# Patient Record
Sex: FEMALE
[De-identification: names, ages, dates, MRNs, and addresses within clinical notes are randomized per-mention and may not be internally consistent; named-entity substitution may affect disease eponyms.]

---

## 2017-04-24 DIAGNOSIS — Q21.3 TETRALOGY OF FALLOT: ICD-10-CM

## 2017-04-24 RX ORDER — AMOXICILLIN 500 MG/1
CAPSULE ORAL
Qty: 12 CAPSULE | Refills: 0 | Status: SHIPPED | OUTPATIENT
Start: 2017-04-24 | End: 2017-05-04

## 2017-05-04 DIAGNOSIS — Q21.3 TETRALOGY OF FALLOT: ICD-10-CM

## 2017-05-04 RX ORDER — AMOXICILLIN 500 MG/1
CAPSULE ORAL
Qty: 12 CAPSULE | Refills: 0 | Status: SHIPPED | OUTPATIENT
Start: 2017-05-04

## 2017-12-24 ENCOUNTER — HEALTH MAINTENANCE LETTER (OUTPATIENT)
Age: 37
End: 2017-12-24

## 2018-02-02 DIAGNOSIS — Q21.3 TETRALOGY OF FALLOT: Primary | ICD-10-CM

## 2018-02-28 ENCOUNTER — PRE VISIT (OUTPATIENT)
Dept: CARDIOLOGY | Facility: CLINIC | Age: 38
End: 2018-02-28

## 2018-03-01 ENCOUNTER — HOSPITAL ENCOUNTER (OUTPATIENT)
Dept: CARDIOLOGY | Facility: CLINIC | Age: 38
Discharge: HOME | End: 2018-03-01
Attending: INTERNAL MEDICINE | Admitting: INTERNAL MEDICINE
Payer: COMMERCIAL

## 2018-03-01 ENCOUNTER — OFFICE VISIT (OUTPATIENT)
Dept: PEDIATRIC CARDIOLOGY | Facility: CLINIC | Age: 38
End: 2018-03-01
Attending: INTERNAL MEDICINE
Payer: COMMERCIAL

## 2018-03-01 VITALS
HEART RATE: 65 BPM | RESPIRATION RATE: 18 BRPM | OXYGEN SATURATION: 99 % | BODY MASS INDEX: 21.91 KG/M2 | WEIGHT: 123.68 LBS | HEIGHT: 63 IN | DIASTOLIC BLOOD PRESSURE: 74 MMHG | SYSTOLIC BLOOD PRESSURE: 113 MMHG

## 2018-03-01 DIAGNOSIS — Q21.3 TETRALOGY OF FALLOT: ICD-10-CM

## 2018-03-01 DIAGNOSIS — Q21.3 TETRALOGY OF FALLOT: Primary | ICD-10-CM

## 2018-03-01 PROCEDURE — 94760 N-INVAS EAR/PLS OXIMETRY 1: CPT | Mod: ZF

## 2018-03-01 PROCEDURE — 93010 ELECTROCARDIOGRAM REPORT: CPT | Mod: ZP | Performed by: INTERNAL MEDICINE

## 2018-03-01 PROCEDURE — 99214 OFFICE O/P EST MOD 30 MIN: CPT | Mod: ZP | Performed by: INTERNAL MEDICINE

## 2018-03-01 PROCEDURE — G0463 HOSPITAL OUTPT CLINIC VISIT: HCPCS | Mod: ZF

## 2018-03-01 PROCEDURE — 93303 ECHO TRANSTHORACIC: CPT

## 2018-03-01 ASSESSMENT — PAIN SCALES - GENERAL: PAINLEVEL: NO PAIN (0)

## 2018-03-01 NOTE — MR AVS SNAPSHOT
"              After Visit Summary   3/1/2018    Tamika Johnson    MRN: [id number]           Patient Information     Date Of Birth          1980        Visit Information        Provider Department      3/1/2018 11:00 AM Nicolasa Christensen MD [facility]        Today's Diagnoses     Tetralogy of Fallot    -  1      Care Instructions    You were seen today in the Adult Congenital and Cardiovascular Genetics Clinic at the [facility].    Cardiology Providers you saw during your visit:  Dr. Nicolasa Christensen    Diagnosis:  Tetrology of Fallot    Results:  The results of your echo were discussed with you today    Recommendations:    1.  Continue to eat a heart healthy, low salt diet.  2.  Continue to get 20-30 minutes of aerobic activity, 4-5 days per week.  Examples of aerobic activity include walking, running, swimming, cycling, etc.  3.  Continue to observe good oral hygiene, with regular dental visits.  4.  Please take your baby aspirin (81mg) daily.   5. Please use caution when taking any other medication that would prolong your QT prolonging: [email or web address]      Vitals:    03/01/18 1012   BP: 113/74   Pulse: 65   Resp: 18   SpO2: 99%   Weight: 56.1 kg (123 lb 10.9 oz)   Height: 1.593 m (5' 2.72\")       SBE prophylaxis:   Yes_X___  No____    Lifelong Bacterial Endocarditis Prophylaxis:  YES_X___  NO____    If YES is checked, follow the recommendations outlined below:  1. Take antibiotic(s) prior to interventional procedures or surgeries (dental, respiratory, urologic, gastrointestinal, gynecologic), or instrumental examinations.   2. Observe good oral hygiene daily, as advised by your dentist. Get regular professional dental care.  3. Keep cuts clean.  4. Infections should be treated promptly.      Exercise restrictions:   Yes__X__  No____         If yes, list restrictions:  Must be allowed to rest if fatigued or SOB      Work " restrictions:  Yes____  No__x__         If yes, list restrictions:    FASTING CHOLESTEROL was checked in the last 5 years YES_x__  NO___ 2016  Continue to eat a heart healthy, low salt diet.         ____ Fasting lipid panel order today         ____ No changes in medications          ____ I recommend the following changes in your cholesterol medications.:          ____ Please follow up for cholesterol screening at your primary care physician      Follow-up:  Follow up with Dr. Christensen in 1 year with an echo and labs (CBC. CMP, TSH and Lipid panel)    For after hours urgent needs, call [phone] and ask to speak to the Adult Congenital Physician on call.  Mention Job Code 0401.    For emergencies call [phone].    For any scheduling needs and to contact your nurse in the Adult Congenital and Cardiovascular Genetics Clinic, please call Figueroa Grimes, Procedure , at [phone].    Thank you for your visit today!  If you have questions or concerns about today's visit, please call me.    Mahogany Baptiste RN, BSN  Cardiology Care Coordinator  [facility]  [email or web address]  Ph.[phone]    [facility]  [facility]  Mail Code 2121CK  [address] [place]  [address]           Follow-ups after your visit        Additional Services     Follow-Up with Cardiologist       Echo and labs prior                  Future tests that were ordered for you today     Open Future Orders        Priority Expected Expires Ordered    TSH with free T4 reflex Routine 3/1/2019 3/1/2019 3/1/2018    CBC with platelets differential Routine 3/1/2019 3/1/2019 3/1/2018    Comprehensive metabolic panel Routine 3/1/2019 3/1/2019 3/1/2018    Lipid panel reflex to direct LDL Fasting Routine 3/1/2019 3/1/2019 3/1/2018    Follow-Up with Cardiologist Routine 3/7/2019 3/29/2019 3/1/2018    Echo congenital adult Routine  "3/7/2019 3/29/2019 3/1/2018    EKG 12-lead, tracing only (Future) Routine  6/29/2018 3/1/2018    Echo pediatric congenital Routine  2/26/2019 2/26/2018            Who to contact     If you have questions or need follow up information about today's clinic visit or your schedule please contact [facility] directly at [phone].  Normal or non-critical lab and imaging results will be communicated to you by TrabajoPanelhart, letter or phone within 4 business days after the clinic has received the results. If you do not hear from us within 7 days, please contact the clinic through [employer] or phone. If you have a critical or abnormal lab result, we will notify you by phone as soon as possible.  Submit refill requests through [employer] or call your pharmacy and they will forward the refill request to us. Please allow 3 business days for your refill to be completed.          Additional Information About Your Visit        [employer] Information     [employer] gives you secure access to your electronic health record. If you see a primary care provider, you can also send messages to your care team and make appointments. If you have questions, please call your primary care clinic.  If you do not have a primary care provider, please call [phone] and they will assist you.        Care EveryWhere ID     This is your Care EveryWhere ID. This could be used by other organizations to access your [facility] medical records  [id number]        Your Vitals Were     Pulse Respirations Height Pulse Oximetry BMI (Body Mass Index)       65 18 1.593 m (5' 2.72\") 99% 22.11 kg/m2        Blood Pressure from Last 3 Encounters:   03/01/18 113/74   09/01/16 120/72   02/04/16 99/59    Weight from Last 3 Encounters:   03/01/18 56.1 kg (123 lb 10.9 oz)   09/01/16 53.7 kg (118 lb 6.2 oz)   02/04/16 52.1 kg (114 lb 13.8 oz)              We Performed the Following     ELECTROCARDIOGRAM REPORT     Follow-Up with Cardiologist        " Primary Care Provider Office Phone # Fax #    Izabella Beaulieu [phone]       [facility] [address] [place] [address]        Equal Access to Services     ROSECRISTINA DANE : Hadii aad ku hadmarleno Soyanelisali, waaxda luqadaha, qaybta kaalmada adeegisraelda, malcom prieto laUgoimani torres. So [facility] [phone].    ATENCIÓN: Si habla español, tiene a kwong disposición servicios gratuitos de asistencia lingüística. Gonzaloame al [phone].    We comply with applicable federal civil rights laws and [place] laws. We do not discriminate on the basis of race, color, national origin, age, disability, sex, sexual orientation, or gender identity.            Thank you!     Thank you for choosing [facility]  for your care. Our goal is always to provide you with excellent care. Hearing back from our patients is one way we can continue to improve our services. Please take a few minutes to complete the written survey that you may receive in the mail after your visit with us. Thank you!             Your Updated Medication List - Protect others around you: Learn how to safely use, store and throw away your medicines at www.disposemymeds.org.          This list is accurate as of 3/1/18 11:11 AM.  Always use your most recent med list.                   Brand Name Dispense Instructions for use Diagnosis    amoxicillin 500 MG capsule    AMOXIL    12 capsule    Take 2000mg (4 capsules) 30-60 minutes prior to dental work    Tetralogy of Fallot       aspirin EC 81 MG EC tablet      Take 1 tablet (81 mg) by mouth daily    Tetralogy of Fallot       ibuprofen 600 MG tablet    ADVIL/MOTRIN    60 tablet    Take 1 tablet (600 mg) by mouth every 6 hours as needed for other (cramping)    S/P  section       oxyCODONE-acetaminophen 5-325 MG per tablet    PERCOCET    40 tablet    Take 1-2 tablets by mouth every 4 hours as needed for moderate to severe pain    S/P  section        PRENATAL VITAMINS PO      Take by mouth daily        senna-docusate 8.6-50 MG per tablet    SENOKOT-S;PERICOLACE    60 tablet    Take 1-2 tablets by mouth 2 times daily    S/P  section       * TEGRETOL PO      Take 600 mg by mouth 2 times daily        * carBAMazepine 200 MG tablet    TEGRETOL    100 tablet    600 mg in the morning, 750 mg in the evening    Seizures (H)       VITAMIN D (CHOLECALCIFEROL) PO      Take 400 Units by mouth daily        * Notice:  This list has 2 medication(s) that are the same as other medications prescribed for you. Read the directions carefully, and ask your doctor or other care provider to review them with you.

## 2018-03-01 NOTE — PATIENT INSTRUCTIONS
"You were seen today in the Adult Congenital and Cardiovascular Genetics Clinic at the [facility].    Cardiology Providers you saw during your visit:  Dr. Nicolasa Christensen    Diagnosis:  Tetrology of Fallot    Results:  The results of your echo were discussed with you today    Recommendations:    1.  Continue to eat a heart healthy, low salt diet.  2.  Continue to get 20-30 minutes of aerobic activity, 4-5 days per week.  Examples of aerobic activity include walking, running, swimming, cycling, etc.  3.  Continue to observe good oral hygiene, with regular dental visits.  4.  Please take your baby aspirin (81mg) daily.   5. Please use caution when taking any other medication that would prolong your QT prolonging: [email or web address]      Vitals:    03/01/18 1012   BP: 113/74   Pulse: 65   Resp: 18   SpO2: 99%   Weight: 56.1 kg (123 lb 10.9 oz)   Height: 1.593 m (5' 2.72\")       SBE prophylaxis:   Yes_X___  No____    Lifelong Bacterial Endocarditis Prophylaxis:  YES_X___  NO____    If YES is checked, follow the recommendations outlined below:  1. Take antibiotic(s) prior to interventional procedures or surgeries (dental, respiratory, urologic, gastrointestinal, gynecologic), or instrumental examinations.   2. Observe good oral hygiene daily, as advised by your dentist. Get regular professional dental care.  3. Keep cuts clean.  4. Infections should be treated promptly.      Exercise restrictions:   Yes__X__  No____         If yes, list restrictions:  Must be allowed to rest if fatigued or SOB      Work restrictions:  Yes____  No__x__         If yes, list restrictions:    FASTING CHOLESTEROL was checked in the last 5 years YES_x__  NO___ 2016  Continue to eat a heart healthy, low salt diet.         ____ Fasting lipid panel order today         ____ No changes in medications          ____ I recommend the following changes in your cholesterol medications.:          ____ Please " follow up for cholesterol screening at your primary care physician      Follow-up:  Follow up with Dr. Christensen in 1 year with an echo and labs (CBC. CMP, TSH and Lipid panel)    For after hours urgent needs, call [phone] and ask to speak to the Adult Congenital Physician on call.  Mention Job Code 0401.    For emergencies call 911.    For any scheduling needs and to contact your nurse in the Adult Congenital and Cardiovascular Genetics Clinic, please call Figueroa Grimes, Procedure , at [phone].    Thank you for your visit today!  If you have questions or concerns about today's visit, please call me.    Mahogany Baptiste RN, BSN  Cardiology Care Coordinator  [facility]  [email or web address]  Ph.[phone]    [facility]  [facility]   Clinics and Surgery Center  Mail Code 2121CK  [address] [place] [address]  [address]

## 2018-03-01 NOTE — NURSING NOTE
"Informant-    Tamika is accompanied by self    Reason for Visit-  TOF    Vitals signs-  /74  Pulse 65  Resp 18  Ht 1.593 m (5' 2.72\")  Wt 56.1 kg (123 lb 10.9 oz)  SpO2 99%  BMI 22.11 kg/m2    There are concerns about the child's exposure to violence in the home: No    Face to Face time: 5 minutes  Taniya Lowery MA      "

## 2018-03-01 NOTE — NURSING NOTE
Chief Complaint   Patient presents with     RECHECK     TOF        Cardiac Testing: Patient given instructions regarding  echocardiogram . Discussed purpose, preparation, procedure and when to expect results reported back to the patient. Patient demonstrated understanding of this information and agreed to call with further questions or concerns.  Labs: Patient was given results of the laboratory testing obtained today. Patient was instructed to return for the next laboratory testing in 1 year . Patient demonstrated understanding of this information and agreed to call with further questions or concerns.   Med Reconcile: Reviewed and verified all current medications with the patient. The updated medication list was printed and given to the patient.  Return Appointment: Patient given instructions regarding scheduling next clinic visit. Patient demonstrated understanding of this information and agreed to call with further questions or concerns.  Patient stated she understood all health information given and agreed to call with further questions or concerns.     Mahogany Baptiste RN, BSN  Cardiology Care Coordinator  [facility]  [email or web address]  [phone]

## 2018-04-02 NOTE — PROGRESS NOTES
HPI: 36 yo female with h/o TOF s/p repair at ~6 months, residual VSD closed at ~10 months and PVR due to significant PI and RV dilation in 1995 who also has a seizure disorder presents for ongoing evaluation and management   Pt reports that she has been feeling well.  She denies any chest pain or pressure, sob/calderón, orthopnea, pnd, palpitations, syncope/presyncope, bernardo or change in exercise tolerance.  She admits that she hasn't been taking her asa regularly.      PAST MEDICAL HISTORY:  Past Medical History:   Diagnosis Date     Abnormal Pap smear     wnl 12/2014     Heart murmur      History of blood transfusion 1980     Seizure disorder (H)      Seizures (H)      Tetralogy of Fallot     s/p repair with patch closure of VSD and pericardial transannular patch 3/22/80, residual VSD closed after development of CHF 7/8/80, and PVR with homograft given significant PI and dilated RV 5/20/92     Thrombocytopenia, unspecified (H) 4/16/2015     WPW (Chivo-Parkinson-White syndrome)        FAMILY HISTORY:  Pt adopted therefore we have no information about her birth family.      Social History     Social History     Marital status:      Spouse name: N/A     Number of children: N/A     Years of education: N/A     Occupational History     Not on file.     Social History Main Topics     Smoking status: Never Smoker     Smokeless tobacco: Not on file     Alcohol use No     Drug use: No     Sexual activity: Yes     Partners: Male     Other Topics Concern     Not on file     Social History Narrative         CURRENT MEDICATIONS:  Current Outpatient Prescriptions   Medication Sig Dispense Refill     amoxicillin (AMOXIL) 500 MG capsule Take 2000mg (4 capsules) 30-60 minutes prior to dental work 12 capsule 0     oxyCODONE-acetaminophen (PERCOCET) 5-325 MG per tablet Take 1-2 tablets by mouth every 4 hours as needed for moderate to severe pain 40 tablet 0     ibuprofen (ADVIL,MOTRIN) 600 MG tablet Take 1 tablet (600 mg) by mouth  "every 6 hours as needed for other (cramping) 60 tablet 0     senna-docusate (SENOKOT-S;PERICOLACE) 8.6-50 MG per tablet Take 1-2 tablets by mouth 2 times daily 60 tablet 1     carBAMazepine (TEGRETOL) 200 MG tablet 600 mg in the morning, 750 mg in the evening 100 tablet 3     VITAMIN D, CHOLECALCIFEROL, PO Take 400 Units by mouth daily       Prenatal Multivit-Min-Fe-FA (PRENATAL VITAMINS PO) Take by mouth daily       CarBAMazepine (TEGRETOL PO) Take 600 mg by mouth 2 times daily       aspirin EC 81 MG tablet Take 1 tablet (81 mg) by mouth daily         ROS:   Constitutional: No fever, chills, or sweats.   ENT: No visual disturbance, ear ache, epistaxis, sore throat.   Allergies/Immunologic: Negative.   Respiratory: No cough, hemoptysis.   Cardiovascular: As per HPI.   GI: No nausea, vomiting, abdominal pain, melena or BRBPR  :No problems with urination.   Integument: Negative.   Psychiatric: Negative.   Neuro: Negative.   Endocrinology: Negative.   Musculoskeletal: Negative.    EXAM:  /74  Pulse 65  Resp 18  Ht 1.593 m (5' 2.72\")  Wt 56.1 kg (123 lb 10.9 oz)  SpO2 99%  BMI 22.11 kg/m2  General: appears comfortable, alert and articulate  Head: normocephalic, atraumatic, auricular deformity  Eyes: anicteric sclera, EOMI  Neck: no adenopathy, 2+ carotids with radiation of systolic murmur  Orophyarynx: moist mucosa, no lesions, dentition intact  Heart: regular, S1/S2, 3/6 ted at LUSB murmur,no diastolic murmur appreciated, no gallop, rub, estimated JVP 6cm  Lungs: clear, no rales or wheezing  Abdomen: soft, non-tender, bowel sounds present, no hepatomegaly  Extremities: no clubbing, cyanosis or edema  Neurological: normal speech and affect, no gross motor deficits      MR CARDIAC W CONTRAST W FLOW QUANT, MRA ANGIOGRAM CHEST W/O & W CONTRAST 8/2/2016      IMPRESSION:    1.  Normal left ventricular size and systolic function with a calculated ejection fraction of 54%.  2.  Mild right ventricular enlargement " (ED volume index: 109.28 ml/m2) with normal systolic function with a calculated ejection fraction of 51%.    3.  There is a basal inferior LV diverticulum that straddles the posterior mitral valve annulus. It measures approximately 3 cm x 2.5cm.  4.  There is moderate tricuspid regurgitation.  5.  There is a bioprosthetic pulmonic valve. There is mild to moderate pulmonary insufficiency (regurgitant volume 12 mL, regurgitant fraction 13%). There is mild to moderate pulmonic stenosis (peak velocity 2.8 m/s, peak pressure gradient 31 mmHg). The stenosis appears to be in the main pulmonary artery distal to the valve, rather than at the valvular level.  6.  On delayed enhancement imaging, there is no abnormal hyperenhancement to suggest myocardial scar/inflammation/infiltration.  7.  There is a right-sided aortic arch, with mirror image branching pattern.  8.  There is mild narrowing of the main pulmonary artery measuring 17mm x 17 mm. There is no branch pulmonary stenosis.      Holter Aug 2016      Echo today  Patient after repair of Tetralogy of Fallot. Post pulmonary valve replacement  with a bio prosthetic valve. Mild pulmonary valve stenosis. The peak gradient  across the pulmonary valve is 28 mmHg. Mild (1+) pulmonary valve  insufficiency. Branch pulmonary arteries not well visualized. There is no  residual ventricular level shunt. The septal leaflet of the tricuspid valve is  tethered. Moderate (3+) tricuspid valve insufficiency. There is moderate right  atrial enlargement. There is mild right ventricular enlargement. Normal right  and left ventricular systolic function. No pericardial effusion.RV pressure  was 44 mm Hg plus RA pressure.    EKG today        Assessment and Plan:  36 yo female with h/o TOF s/p repair at ~6 months, residual VSD closed at ~10 months and PVR due to significant PI and RV dilation in 1995 who also has a seizure disorder presents for ongoing evaluation and management  1.  TOF s/p repair  with PVR 1995:  Pt is grossly euvolemic today with no reported exercise intolerance.  Echo today with normal biventricular function and stable PV bioprosthetic valve function.   BP today at goal.  Reinforced with patient need for asa 81mg daily.   Reminded patient of need to maintain good oral hygiene and continue regular dental care with SBE prophylaxis given PVR.  Encouraged patient to begin regular aerobic exercise aiming for at least 150 minutes of moderate physical activity or 75 minutes of vigorous physical activity - or an equal combination of both - each week. and follow low-salt, heart healthy diet.  2.  H/o prolonged QT with first pregnancy and WPW:  No significant palpitations.  Last 48 hour Holter monitor confirms no significant arrhthymias.  EKG today as above. Pt instructed need to use caution when taking any other medication that could prolong your QT.  Website provided: [email or web address]       Follow-up:  1 year with an echo and labs.  Will be happy to see sooner if questions/concerns arise.      Nicolasa Christensen MD  Section Head - Advanced Heart Failure, Transplantation and Mechanical Circulatory Support  Co-Director - Adult Congenital and Cardiovascular Genetics Center  Associate Professor of Medicine, [employer] [facility]

## 2020-03-10 ENCOUNTER — HEALTH MAINTENANCE LETTER (OUTPATIENT)
Age: 40
End: 2020-03-10

## 2020-03-25 ENCOUNTER — TELEPHONE (OUTPATIENT)
Dept: CARDIOLOGY | Facility: CLINIC | Age: 40
End: 2020-03-25

## 2020-03-25 NOTE — TELEPHONE ENCOUNTER
Left VM for patient to let her know that we are not scheduling annual follow ups at this time, and that she is placed on a reschedule list to be called as soon as we are able to schedule those.    Gave callback for any questions.

## 2020-06-22 ENCOUNTER — CARE COORDINATION (OUTPATIENT)
Dept: CARDIOLOGY | Facility: CLINIC | Age: 40
End: 2020-06-22

## 2020-06-22 DIAGNOSIS — Q21.3 TETRALOGY OF FALLOT: Primary | ICD-10-CM

## 2020-06-22 NOTE — PROGRESS NOTES
Received call from Tamika that she was looking to get in with Dr. Christensen, as she had been postponed earlier this year to COVID-19 restrictions. Patient scheduled in person for Friday, June 26 with labs and echo prior.

## 2020-06-23 ENCOUNTER — TELEPHONE (OUTPATIENT)
Dept: CARDIOLOGY | Facility: CLINIC | Age: 40
End: 2020-06-23

## 2020-06-23 NOTE — TELEPHONE ENCOUNTER
Left VM that we added a lab appointment to her visit on 6/26/20. Left my contact if she has any questions.

## 2020-06-25 ASSESSMENT — ENCOUNTER SYMPTOMS
VOMITING: 0
NAUSEA: 0
CONSTIPATION: 0
BLOOD IN STOOL: 0
SKIN CHANGES: 0
BOWEL INCONTINENCE: 0
RECTAL PAIN: 0
DIARRHEA: 1
JAUNDICE: 0
POOR WOUND HEALING: 0
BLOATING: 0
ABDOMINAL PAIN: 0
NAIL CHANGES: 0
HEARTBURN: 1

## 2020-06-26 ENCOUNTER — OFFICE VISIT (OUTPATIENT)
Dept: CARDIOLOGY | Facility: CLINIC | Age: 40
End: 2020-06-26
Attending: INTERNAL MEDICINE
Payer: COMMERCIAL

## 2020-06-26 VITALS
WEIGHT: 124.5 LBS | BODY MASS INDEX: 22.06 KG/M2 | TEMPERATURE: 98.6 F | OXYGEN SATURATION: 98 % | DIASTOLIC BLOOD PRESSURE: 73 MMHG | HEIGHT: 63 IN | SYSTOLIC BLOOD PRESSURE: 123 MMHG | HEART RATE: 54 BPM

## 2020-06-26 DIAGNOSIS — Q21.3 TETRALOGY OF FALLOT: ICD-10-CM

## 2020-06-26 LAB
ALBUMIN SERPL-MCNC: 3.8 G/DL (ref 3.4–5)
ALP SERPL-CCNC: 75 U/L (ref 40–150)
ALT SERPL W P-5'-P-CCNC: 18 U/L (ref 0–50)
ANION GAP SERPL CALCULATED.3IONS-SCNC: 4 MMOL/L (ref 3–14)
AST SERPL W P-5'-P-CCNC: 16 U/L (ref 0–45)
BASOPHILS # BLD AUTO: 0.1 10E9/L (ref 0–0.2)
BASOPHILS NFR BLD AUTO: 1 %
BILIRUB SERPL-MCNC: 0.4 MG/DL (ref 0.2–1.3)
BUN SERPL-MCNC: 11 MG/DL (ref 7–30)
CALCIUM SERPL-MCNC: 8.1 MG/DL (ref 8.5–10.1)
CHLORIDE SERPL-SCNC: 104 MMOL/L (ref 94–109)
CHOLEST SERPL-MCNC: 154 MG/DL
CO2 SERPL-SCNC: 28 MMOL/L (ref 20–32)
CREAT SERPL-MCNC: 0.74 MG/DL (ref 0.52–1.04)
DIFFERENTIAL METHOD BLD: NORMAL
EOSINOPHIL # BLD AUTO: 0.3 10E9/L (ref 0–0.7)
EOSINOPHIL NFR BLD AUTO: 6 %
ERYTHROCYTE [DISTWIDTH] IN BLOOD BY AUTOMATED COUNT: 12 % (ref 10–15)
GFR SERPL CREATININE-BSD FRML MDRD: >90 ML/MIN/{1.73_M2}
GLUCOSE SERPL-MCNC: 94 MG/DL (ref 70–99)
HCT VFR BLD AUTO: 37.4 % (ref 35–47)
HDLC SERPL-MCNC: 84 MG/DL
HGB BLD-MCNC: 12.7 G/DL (ref 11.7–15.7)
IMM GRANULOCYTES # BLD: 0 10E9/L (ref 0–0.4)
IMM GRANULOCYTES NFR BLD: 0.2 %
LDLC SERPL CALC-MCNC: 41 MG/DL
LYMPHOCYTES # BLD AUTO: 1.7 10E9/L (ref 0.8–5.3)
LYMPHOCYTES NFR BLD AUTO: 32.9 %
MCH RBC QN AUTO: 30.8 PG (ref 26.5–33)
MCHC RBC AUTO-ENTMCNC: 34 G/DL (ref 31.5–36.5)
MCV RBC AUTO: 91 FL (ref 78–100)
MONOCYTES # BLD AUTO: 0.3 10E9/L (ref 0–1.3)
MONOCYTES NFR BLD AUTO: 6.6 %
NEUTROPHILS # BLD AUTO: 2.7 10E9/L (ref 1.6–8.3)
NEUTROPHILS NFR BLD AUTO: 53.3 %
NONHDLC SERPL-MCNC: 70 MG/DL
NRBC # BLD AUTO: 0 10*3/UL
NRBC BLD AUTO-RTO: 0 /100
PLATELET # BLD AUTO: 160 10E9/L (ref 150–450)
POTASSIUM SERPL-SCNC: 3.8 MMOL/L (ref 3.4–5.3)
PROT SERPL-MCNC: 7.4 G/DL (ref 6.8–8.8)
RBC # BLD AUTO: 4.13 10E12/L (ref 3.8–5.2)
SODIUM SERPL-SCNC: 137 MMOL/L (ref 133–144)
TRIGL SERPL-MCNC: 142 MG/DL
TSH SERPL DL<=0.005 MIU/L-ACNC: 1.98 MU/L (ref 0.4–4)
WBC # BLD AUTO: 5 10E9/L (ref 4–11)

## 2020-06-26 PROCEDURE — 80053 COMPREHEN METABOLIC PANEL: CPT | Performed by: INTERNAL MEDICINE

## 2020-06-26 PROCEDURE — G0463 HOSPITAL OUTPT CLINIC VISIT: HCPCS | Mod: ZF

## 2020-06-26 PROCEDURE — 84443 ASSAY THYROID STIM HORMONE: CPT | Performed by: INTERNAL MEDICINE

## 2020-06-26 PROCEDURE — 93303 ECHO TRANSTHORACIC: CPT

## 2020-06-26 PROCEDURE — 36415 COLL VENOUS BLD VENIPUNCTURE: CPT | Performed by: INTERNAL MEDICINE

## 2020-06-26 PROCEDURE — 99203 OFFICE O/P NEW LOW 30 MIN: CPT | Mod: 25 | Performed by: INTERNAL MEDICINE

## 2020-06-26 PROCEDURE — 85025 COMPLETE CBC W/AUTO DIFF WBC: CPT | Performed by: INTERNAL MEDICINE

## 2020-06-26 PROCEDURE — 80061 LIPID PANEL: CPT | Performed by: INTERNAL MEDICINE

## 2020-06-26 ASSESSMENT — PAIN SCALES - GENERAL: PAINLEVEL: NO PAIN (0)

## 2020-06-26 ASSESSMENT — MIFFLIN-ST. JEOR: SCORE: 1203.86

## 2020-06-26 NOTE — PATIENT INSTRUCTIONS
You were seen today in the Adult Congenital and Cardiovascular Genetics Clinic at the [facility].    Cardiology Providers you saw during your visit:  Nicolasa Christensen MD    Diagnosis:  Tetralogy of Fallot    Results:  Nicolasa Christensen MD reviewed the results of your echo and lab testing today in clinic.    Recommendations:    1. Continue to eat a heart healthy, low salt diet.  2. Continue to get 20-30 minutes of aerobic activity, 4-5 days per week.  Examples of aerobic activity include walking, running, swimming, cycling, etc.  3. Continue to observe good oral hygiene, with regular dental visits.  4. We will call you with the final read on your echocardiogram.  5. If you experience palpitations again, please call us and we can set you up with a Zio patch monitor.    SBE prophylaxis:   Yes____  No__X__    Lifelong Bacterial Endocarditis Prophylaxis:  YES____  NO____    If YES is checked, follow the recommendations outlined below:  1. Take antibiotic(s) prior to recommended dental procedures and procedures on the respiratory tract or with infected skin, muscle or bones. SBE prophylaxis is not needed for routine GI and  procedures (ie. Colonoscopy or vaginal delivery)  2. Observe good oral hygiene daily, as advised by your dentist. Get regular professional dental care.  3. Keep cuts clean.  4. Infections should be treated promptly.  5. Symptoms of Infective Endocarditis could include: fever lasting more than 4-5 days or a recurrent fever that initially resolves but returns within 1-2 days)    Exercise restrictions:   Yes__X__  No____         If yes, list restrictions:  Must be allowed to rest if fatigued or SOB    Work restrictions:  Yes____  No_X___         If yes, list restrictions:    FASTING CHOLESTEROL was checked in the last 5 years YES_X__  NO___ (2020)  Continue to eat a heart healthy, low salt diet.         ____ Fasting lipid panel order today         ____ No changes in medications          ____ I  recommend the following changes in your cholesterol medications.:          ____ Please follow up for cholesterol screening at your primary care physician      Follow-up:  If echo is the same as previously, follow up with Dr. Christensen in 2 years with cMRI and Zio prior. If the echo is different, we will see you in one year with echo and Zio prior.    If you have questions or concerns please contact us at:    Chery Martinez, MSN, RN, CNL    Saniya Andrea (Scheduling)  Nurse Care Coordinator     Clinic   Adult Congenital and CV Genetics   Adult Congenital and CV Genetic  [facility]   [facility]  (P) [phone]     (P) [phone]  [email or web address]   (F) [phone]        For after hours urgent needs, call [phone] and ask to speak to the Adult Congenital Physician on call.  Mention Job Code 0401.    For emergencies call 91.    [facility]  [facility]   Clinics and Surgery Center  Mail Code [id number]  [address] [place]  [address]

## 2020-06-26 NOTE — NURSING NOTE
Cardiac Monitors: Patient was instructed regarding the indication, function, care and prompt return of a Zio patch monitor. The monitor was placed on the patient with instructions regarding care of the skin electrodes and monitor, as well as documentation in the patient diary. Patient demonstrated understanding of this information and agreed to call with further questions or concerns.    Cardiac Testing: Patient given instructions regarding cardiac MRI or echocardiogram . Discussed purpose, preparation, procedure and when to expect results reported back to the patient. Patient demonstrated understanding of this information and agreed to call with further questions or concerns.    Diet: Patient instructed regarding a heart healthy diet, including discussion of reduced fat and sodium intake. Patient demonstrated understanding of this information and agreed to call with further questions or concerns.    Labs: Patient was given results of the laboratory testing obtained today. Patient demonstrated understanding of this information and agreed to call with further questions or concerns.     Med Reconcile: Reviewed and verified all current medications with the patient. The updated medication list was printed and given to the patient.    Return Appointment: Patient given instructions regarding scheduling next clinic visit. Patient demonstrated understanding of this information and agreed to call with further questions or concerns.    Patient stated she understood all health information given and agreed to call with further questions or concerns.    Chery Martinez, MSN, RN, CNL  Cardiology Care Coordinator  [facility]  [phone]

## 2020-06-26 NOTE — LETTER
6/26/2020      RE: Tamika Johnson  [address]  [place] [address]       Dear Colleague,    Thank you for the opportunity to participate in the care of your patient, Tamika Johnson, at the [facility]. Please see a copy of my visit note below.    HPI: 38 yo female with h/o TOF s/p repair at ~6 months, residual VSD closed at ~10 months and PVR due to significant PI and RV dilation in 1995 who also has a seizure disorder presents for ongoing evaluation and management   Pt reports that overall she has been feeling well.  She notes that since last visit she has had a few of episodes of palpitations.  Longest lasted ~5 minutes.  She denies any associated symptoms with the palpitations.  There were also no identified exacerbating or alleviating factors.  Last episode occurred a few months ago.  She denies any chest pain or pressure, sob/calderón, orthopnea, pnd, syncope/presyncope, ebrnardo or change in exercise tolerance.  She continues to have difficulty taking her asa regularly.      PAST MEDICAL HISTORY:  Past Medical History:   Diagnosis Date     Abnormal Pap smear     wnl 12/2014     Heart murmur      History of blood transfusion 1980     Seizure disorder (H)      Seizures (H)      Tetralogy of Fallot     s/p repair with patch closure of VSD and pericardial transannular patch 3/22/80, residual VSD closed after development of CHF 7/8/80, and PVR with homograft given significant PI and dilated RV 5/20/92     Thrombocytopenia, unspecified (H) 4/16/2015     WPW (Chivo-Parkinson-White syndrome)        FAMILY HISTORY:  Pt adopted therefore we have no information about her birth family.      Social History     Socioeconomic History     Marital status:      Spouse name: Not on file     Number of children: Not on file     Years of education: Not on file     Highest education level: Not on file   Occupational History     Not on file   Social Needs     Financial resource strain:  Not on file     Food insecurity     Worry: Not on file     Inability: Not on file     Transportation needs     Medical: Not on file     Non-medical: Not on file   Tobacco Use     Smoking status: Never Smoker   Substance and Sexual Activity     Alcohol use: No     Drug use: No     Sexual activity: Yes     Partners: Male   Lifestyle     Physical activity     Days per week: Not on file     Minutes per session: Not on file     Stress: Not on file   Relationships     Social connections     Talks on phone: Not on file     Gets together: Not on file     Attends L[employer] service: Not on file     Active member of club or organization: Not on file     Attends meetings of clubs or organizations: Not on file     Relationship status: Not on file     Intimate partner violence     Fear of current or ex partner: Not on file     Emotionally abused: Not on file     Physically abused: Not on file     Forced sexual activity: Not on file   Other Topics Concern     Parent/sibling w/ CABG, MI or angioplasty before 65F 55M? Not Asked   Social History Narrative     Not on file         CURRENT MEDICATIONS:  Current Outpatient Medications   Medication Sig Dispense Refill     amoxicillin (AMOXIL) 500 MG capsule Take 2000mg (4 capsules) 30-60 minutes prior to dental work 12 capsule 0     aspirin EC 81 MG tablet Take 1 tablet (81 mg) by mouth daily       carBAMazepine (TEGRETOL) 200 MG tablet 600 mg in the morning, 750 mg in the evening 100 tablet 3     VITAMIN D, CHOLECALCIFEROL, PO Take 400 Units by mouth daily       CarBAMazepine (TEGRETOL PO) Take 600 mg by mouth 2 times daily       ibuprofen (ADVIL,MOTRIN) 600 MG tablet Take 1 tablet (600 mg) by mouth every 6 hours as needed for other (cramping) 60 tablet 0     oxyCODONE-acetaminophen (PERCOCET) 5-325 MG per tablet Take 1-2 tablets by mouth every 4 hours as needed for moderate to severe pain 40 tablet 0     Prenatal Multivit-Min-Fe-FA (PRENATAL VITAMINS PO) Take by mouth daily        "senna-docusate (SENOKOT-S;PERICOLACE) 8.6-50 MG per tablet Take 1-2 tablets by mouth 2 times daily 60 tablet 1       ROS:   Constitutional: No fever, chills, or sweats.   ENT: No visual disturbance, ear ache, epistaxis, sore throat.   Allergies/Immunologic: Negative.   Respiratory: No cough, hemoptysis.   Cardiovascular: As per HPI.   GI: No nausea, vomiting, abdominal pain, melena or BRBPR  :No problems with urination.   Integument: Negative.   Psychiatric: Negative.   Neuro: Negative.   Endocrinology: Negative.   Musculoskeletal: Negative.    EXAM:  /73 (BP Location: Right arm, Patient Position: Chair, Cuff Size: Adult Regular)   Pulse 54   Temp 98.6  F (37  C)   Ht 1.6 m (5' 3\")   Wt 56.5 kg (124 lb 8 oz)   SpO2 98%   BMI 22.05 kg/m    General: appears comfortable, alert and articulate  Head: normocephalic, atraumatic, auricular deformity  Eyes: anicteric sclera, EOMI  Neck: no adenopathy, 2+ carotids with radiation of systolic murmur  Orophyarynx: moist mucosa, no lesions, dentition intact  Heart: regular, S1/S2, 3/6 ted at LUSB murmur,no diastolic murmur appreciated, no gallop, rub, estimated JVP 6-7cm  Lungs: clear, no rales or wheezing  Abdomen: soft, non-tender, bowel sounds present, no hepatomegaly  Extremities: no clubbing, cyanosis or edema  Neurological: normal speech and affect, no gross motor deficits      MR CARDIAC W CONTRAST W FLOW QUANT, MRA ANGIOGRAM CHEST W/O & W CONTRAST 8/2/2016      IMPRESSION:    1.  Normal left ventricular size and systolic function with a calculated ejection fraction of 54%.  2.  Mild right ventricular enlargement (ED volume index: 109.28 ml/m2) with normal systolic function with a calculated ejection fraction of 51%.    3.  There is a basal inferior LV diverticulum that straddles the posterior mitral valve annulus. It measures approximately 3 cm x 2.5cm.  4.  There is moderate tricuspid regurgitation.  5.  There is a bioprosthetic pulmonic valve. There is " mild to moderate pulmonary insufficiency (regurgitant volume 12 mL, regurgitant fraction 13%). There is mild to moderate pulmonic stenosis (peak velocity 2.8 m/s, peak pressure gradient 31 mmHg). The stenosis appears to be in the main pulmonary artery distal to the valve, rather than at the valvular level.  6.  On delayed enhancement imaging, there is no abnormal hyperenhancement to suggest myocardial scar/inflammation/infiltration.  7.  There is a right-sided aortic arch, with mirror image branching pattern.  8.  There is mild narrowing of the main pulmonary artery measuring 17mm x 17 mm. There is no branch pulmonary stenosis.      Holter Aug 2016      Echo 3/21/18  Patient after repair of Tetralogy of Fallot. Post pulmonary valve replacement  with a bio prosthetic valve. Mild pulmonary valve stenosis. The peak gradient  across the pulmonary valve is 28 mmHg. Mild (1+) pulmonary valve  insufficiency. Branch pulmonary arteries not well visualized. There is no  residual ventricular level shunt. The septal leaflet of the tricuspid valve is  tethered. Moderate (3+) tricuspid valve insufficiency. There is moderate right  atrial enlargement. There is mild right ventricular enlargement. Normal right  and left ventricular systolic function. No pericardial effusion.RV pressure  was 44 mm Hg plus RA pressure.    Echo today  Patient after repair of Tetralogy of Fallot. Post pulmonary valve replacement  with a bio prosthetic valve. Mild pulmonary valve stenosis. The peak gradient  across the pulmonary valve is 30 mmHg. Mild (1+) pulmonary valve  insufficiency. Branch pulmonary arteries not well visualized. There is no  residual ventricular level shunt. The septal leaflet of the tricuspid valve is  tethered. Moderate (3+) tricuspid valve insufficiency. There is moderate right  atrial enlargement. There is mild right ventricular enlargement. Normal right  and left ventricular systolic function. The calculated biplane  left  ventricular ejection fraction is 64 %. No pericardial effusion. RV pressure  was 43 mm Hg plus RA pressure.      Assessment and Plan:  38 yo female with h/o TOF s/p repair at ~6 months, residual VSD closed at ~10 months and PVR due to significant PI and RV dilation in 1995 who also has a seizure disorder presents for ongoing evaluation and management  1.  TOF s/p repair with PVR 1995:  Pt is grossly euvolemic today with no reported exercise intolerance.  Echo today with normal biventricular function and stable PV bioprosthetic valve function.   BP today at goal.  Reinforced with patient need for asa 81mg daily.   Reminded patient of need to maintain good oral hygiene and continue regular dental care with SBE prophylaxis given PVR.  Encouraged patient to begin regular aerobic exercise aiming for at least 150 minutes of moderate physical activity or 75 minutes of vigorous physical activity - or an equal combination of both - each week. and follow low-salt, heart healthy diet.  2.  H/o prolonged QT with first pregnancy and WPW:  Repeat EKGs with normal Qtc.  Last 48 hour Holter monitor confirms no significant arrhthymias.  Several months ago patient had recurrent palpitations.  These were without associated symptoms and have not recurrent.  If recur, will obtain Zio monitor.  Pt instructed need to use caution when taking any other medication that could prolong your QT.  Website provided: [email or web address]       Follow-up:  2 years with cardiac MRI, Zio monitor, EKG and labs.  Will be happy to see sooner if questions/concerns arise.      Nicolasa Christensen MD  Section Head - Advanced Heart Failure, Transplantation and Mechanical Circulatory Support  Director - Adult Congenital and Cardiovascular Genetics Center  Associate Professor of Medicine, [facility]      Please do not hesitate to contact me if you have any questions/concerns.     Sincerely,     Nicolasa Flores  MD Fermin

## 2020-07-26 NOTE — PROGRESS NOTES
HPI: 38 yo female with h/o TOF s/p repair at ~6 months, residual VSD closed at ~10 months and PVR due to significant PI and RV dilation in 1995 who also has a seizure disorder presents for ongoing evaluation and management   Pt reports that overall she has been feeling well.  She notes that since last visit she has had a few of episodes of palpitations.  Longest lasted ~5 minutes.  She denies any associated symptoms with the palpitations.  There were also no identified exacerbating or alleviating factors.  Last episode occurred a few months ago.  She denies any chest pain or pressure, sob/calderón, orthopnea, pnd, syncope/presyncope, bernardo or change in exercise tolerance.  She continues to have difficulty taking her asa regularly.      PAST MEDICAL HISTORY:  Past Medical History:   Diagnosis Date     Abnormal Pap smear     wnl 12/2014     Heart murmur      History of blood transfusion 1980     Seizure disorder (H)      Seizures (H)      Tetralogy of Fallot     s/p repair with patch closure of VSD and pericardial transannular patch 3/22/80, residual VSD closed after development of CHF 7/8/80, and PVR with homograft given significant PI and dilated RV 5/20/92     Thrombocytopenia, unspecified (H) 4/16/2015     WPW (Chivo-Parkinson-White syndrome)        FAMILY HISTORY:  Pt adopted therefore we have no information about her birth family.      Social History     Socioeconomic History     Marital status:      Spouse name: Not on file     Number of children: Not on file     Years of education: Not on file     Highest education level: Not on file   Occupational History     Not on file   Social Needs     Financial resource strain: Not on file     Food insecurity     Worry: Not on file     Inability: Not on file     Transportation needs     Medical: Not on file     Non-medical: Not on file   Tobacco Use     Smoking status: Never Smoker   Substance and Sexual Activity     Alcohol use: No     Drug use: No     Sexual activity:  Yes     Partners: Male   Lifestyle     Physical activity     Days per week: Not on file     Minutes per session: Not on file     Stress: Not on file   Relationships     Social connections     Talks on phone: Not on file     Gets together: Not on file     Attends [employer] service: Not on file     Active member of club or organization: Not on file     Attends meetings of clubs or organizations: Not on file     Relationship status: Not on file     Intimate partner violence     Fear of current or ex partner: Not on file     Emotionally abused: Not on file     Physically abused: Not on file     Forced sexual activity: Not on file   Other Topics Concern     Parent/sibling w/ CABG, MI or angioplasty before 65F 55M? Not Asked   Social History Narrative     Not on file         CURRENT MEDICATIONS:  Current Outpatient Medications   Medication Sig Dispense Refill     amoxicillin (AMOXIL) 500 MG capsule Take 2000mg (4 capsules) 30-60 minutes prior to dental work 12 capsule 0     aspirin EC 81 MG tablet Take 1 tablet (81 mg) by mouth daily       carBAMazepine (TEGRETOL) 200 MG tablet 600 mg in the morning, 750 mg in the evening 100 tablet 3     VITAMIN D, CHOLECALCIFEROL, PO Take 400 Units by mouth daily       CarBAMazepine (TEGRETOL PO) Take 600 mg by mouth 2 times daily       ibuprofen (ADVIL,MOTRIN) 600 MG tablet Take 1 tablet (600 mg) by mouth every 6 hours as needed for other (cramping) 60 tablet 0     oxyCODONE-acetaminophen (PERCOCET) 5-325 MG per tablet Take 1-2 tablets by mouth every 4 hours as needed for moderate to severe pain 40 tablet 0     Prenatal Multivit-Min-Fe-FA (PRENATAL VITAMINS PO) Take by mouth daily       senna-docusate (SENOKOT-S;PERICOLACE) 8.6-50 MG per tablet Take 1-2 tablets by mouth 2 times daily 60 tablet 1       ROS:   Constitutional: No fever, chills, or sweats.   ENT: No visual disturbance, ear ache, epistaxis, sore throat.   Allergies/Immunologic: Negative.   Respiratory: No cough, hemoptysis.  "  Cardiovascular: As per HPI.   GI: No nausea, vomiting, abdominal pain, melena or BRBPR  :No problems with urination.   Integument: Negative.   Psychiatric: Negative.   Neuro: Negative.   Endocrinology: Negative.   Musculoskeletal: Negative.    EXAM:  /73 (BP Location: Right arm, Patient Position: Chair, Cuff Size: Adult Regular)   Pulse 54   Temp 98.6  F (37  C)   Ht 1.6 m (5' 3\")   Wt 56.5 kg (124 lb 8 oz)   SpO2 98%   BMI 22.05 kg/m    General: appears comfortable, alert and articulate  Head: normocephalic, atraumatic, auricular deformity  Eyes: anicteric sclera, EOMI  Neck: no adenopathy, 2+ carotids with radiation of systolic murmur  Orophyarynx: moist mucosa, no lesions, dentition intact  Heart: regular, S1/S2, 3/6 ted at LUSB murmur,no diastolic murmur appreciated, no gallop, rub, estimated JVP 6-7cm  Lungs: clear, no rales or wheezing  Abdomen: soft, non-tender, bowel sounds present, no hepatomegaly  Extremities: no clubbing, cyanosis or edema  Neurological: normal speech and affect, no gross motor deficits      MR CARDIAC W CONTRAST W FLOW QUANT, MRA ANGIOGRAM CHEST W/O & W CONTRAST 8/2/2016      IMPRESSION:    1.  Normal left ventricular size and systolic function with a calculated ejection fraction of 54%.  2.  Mild right ventricular enlargement (ED volume index: 109.28 ml/m2) with normal systolic function with a calculated ejection fraction of 51%.    3.  There is a basal inferior LV diverticulum that straddles the posterior mitral valve annulus. It measures approximately 3 cm x 2.5cm.  4.  There is moderate tricuspid regurgitation.  5.  There is a bioprosthetic pulmonic valve. There is mild to moderate pulmonary insufficiency (regurgitant volume 12 mL, regurgitant fraction 13%). There is mild to moderate pulmonic stenosis (peak velocity 2.8 m/s, peak pressure gradient 31 mmHg). The stenosis appears to be in the main pulmonary artery distal to the valve, rather than at the valvular " level.  6.  On delayed enhancement imaging, there is no abnormal hyperenhancement to suggest myocardial scar/inflammation/infiltration.  7.  There is a right-sided aortic arch, with mirror image branching pattern.  8.  There is mild narrowing of the main pulmonary artery measuring 17mm x 17 mm. There is no branch pulmonary stenosis.      Holter Aug 2016      Echo 3/21/18  Patient after repair of Tetralogy of Fallot. Post pulmonary valve replacement  with a bio prosthetic valve. Mild pulmonary valve stenosis. The peak gradient  across the pulmonary valve is 28 mmHg. Mild (1+) pulmonary valve  insufficiency. Branch pulmonary arteries not well visualized. There is no  residual ventricular level shunt. The septal leaflet of the tricuspid valve is  tethered. Moderate (3+) tricuspid valve insufficiency. There is moderate right  atrial enlargement. There is mild right ventricular enlargement. Normal right  and left ventricular systolic function. No pericardial effusion.RV pressure  was 44 mm Hg plus RA pressure.    Echo today  Patient after repair of Tetralogy of Fallot. Post pulmonary valve replacement  with a bio prosthetic valve. Mild pulmonary valve stenosis. The peak gradient  across the pulmonary valve is 30 mmHg. Mild (1+) pulmonary valve  insufficiency. Branch pulmonary arteries not well visualized. There is no  residual ventricular level shunt. The septal leaflet of the tricuspid valve is  tethered. Moderate (3+) tricuspid valve insufficiency. There is moderate right  atrial enlargement. There is mild right ventricular enlargement. Normal right  and left ventricular systolic function. The calculated biplane left  ventricular ejection fraction is 64 %. No pericardial effusion. RV pressure  was 43 mm Hg plus RA pressure.      Assessment and Plan:  40 yo female with h/o TOF s/p repair at ~6 months, residual VSD closed at ~10 months and PVR due to significant PI and RV dilation in 1995 who also has a seizure disorder  presents for ongoing evaluation and management  1.  TOF s/p repair with PVR 1995:  Pt is grossly euvolemic today with no reported exercise intolerance.  Echo today with normal biventricular function and stable PV bioprosthetic valve function.   BP today at goal.  Reinforced with patient need for asa 81mg daily.   Reminded patient of need to maintain good oral hygiene and continue regular dental care with SBE prophylaxis given PVR.  Encouraged patient to begin regular aerobic exercise aiming for at least 150 minutes of moderate physical activity or 75 minutes of vigorous physical activity - or an equal combination of both - each week. and follow low-salt, heart healthy diet.  2.  H/o prolonged QT with first pregnancy and WPW:  Repeat EKGs with normal Qtc.  Last 48 hour Holter monitor confirms no significant arrhthymias.  Several months ago patient had recurrent palpitations.  These were without associated symptoms and have not recurrent.  If recur, will obtain Zio monitor.  Pt instructed need to use caution when taking any other medication that could prolong your QT.  Website provided: [email or web address]       Follow-up:  2 years with cardiac MRI, Zio monitor, EKG and labs.  Will be happy to see sooner if questions/concerns arise.      Nicolasa Christensen MD  Section Head - Advanced Heart Failure, Transplantation and Mechanical Circulatory Support  Director - [facility]  Associate Professor of Medicine, [facility]

## 2020-12-20 ENCOUNTER — HEALTH MAINTENANCE LETTER (OUTPATIENT)
Age: 40
End: 2020-12-20

## 2021-04-24 ENCOUNTER — HEALTH MAINTENANCE LETTER (OUTPATIENT)
Age: 41
End: 2021-04-24

## 2021-10-03 ENCOUNTER — HEALTH MAINTENANCE LETTER (OUTPATIENT)
Age: 41
End: 2021-10-03

## 2022-05-15 ENCOUNTER — HEALTH MAINTENANCE LETTER (OUTPATIENT)
Age: 42
End: 2022-05-15

## 2022-09-11 ENCOUNTER — HEALTH MAINTENANCE LETTER (OUTPATIENT)
Age: 42
End: 2022-09-11

## 2023-06-03 ENCOUNTER — HEALTH MAINTENANCE LETTER (OUTPATIENT)
Age: 43
End: 2023-06-03

## 2024-02-18 ENCOUNTER — HEALTH MAINTENANCE LETTER (OUTPATIENT)
Age: 44
End: 2024-02-18